# Patient Record
Sex: FEMALE | Race: WHITE
[De-identification: names, ages, dates, MRNs, and addresses within clinical notes are randomized per-mention and may not be internally consistent; named-entity substitution may affect disease eponyms.]

---

## 2018-01-02 ENCOUNTER — HOSPITAL ENCOUNTER (EMERGENCY)
Dept: HOSPITAL 25 - UCCORT | Age: 33
Discharge: HOME | End: 2018-01-02
Payer: COMMERCIAL

## 2018-01-02 VITALS — DIASTOLIC BLOOD PRESSURE: 73 MMHG | SYSTOLIC BLOOD PRESSURE: 133 MMHG

## 2018-01-02 DIAGNOSIS — J40: Primary | ICD-10-CM

## 2018-01-02 PROCEDURE — G0463 HOSPITAL OUTPT CLINIC VISIT: HCPCS

## 2018-01-02 PROCEDURE — 99212 OFFICE O/P EST SF 10 MIN: CPT

## 2018-01-02 NOTE — UC
Respiratory Complaint HPI





- HPI Summary


HPI Summary: 





6 days of bronchial cough, fatigue, no real fevers, coughing hurts center of 

chest





- History of Current Complaint


Chief Complaint: UCRespiratory


Stated Complaint: COUGH


Time Seen by Provider: 01/02/18 15:23


Hx Obtained From: Patient


Hx Last Menstrual Period: ON DEPO, DOES NOT HAVE REG PERIODS


Pregnant?: No


Onset/Duration: Gradual Onset, Lasting Days - 6, Still Present


Timing: Constant


Severity Initially: Moderate


Severity Currently: Moderate


Character: Cough: Nonproductive


Aggravating Factors: Nothing


Alleviating Factors: Nothing


Associated Signs And Symptoms: Positive: Chills, Pleuritic Chest Pain, URI





- Allergies/Home Medications


Allergies/Adverse Reactions: 


 Allergies











Allergy/AdvReac Type Severity Reaction Status Date / Time


 


No Known Allergies Allergy   Verified 01/02/18 15:28











Home Medications: 


 Home Medications





Etonogestrel [Nexplanon]  01/02/18 [History]











PMH/Surg Hx/FS Hx/Imm Hx


Previously Healthy: No


Psychological History: Depression





- Surgical History


Surgical History: Yes


Surgery Procedure, Year, and Place: Appy and bariatric surgery 6/2012





- Family History


Known Family History: Positive: Diabetes


   Negative: Cardiac Disease, Hypertension





- Social History


Occupation: Employed Full-time


Lives: With Family


Alcohol Use: Rare


Substance Use Type: None


Smoking Status (MU): Light Every Day Tobacco Smoker


Amount Used/How Often: 1/4 PPD


Cessation Counseling: Patient Advised to Stop





Review of Systems


Constitutional: Chills, Fatigue


Skin: Negative


Eyes: Negative


ENT: Sore Throat, Nasal Discharge


Respiratory: Cough


Cardiovascular: Negative


Gastrointestinal: Negative


Genitourinary: Negative


Motor: Negative


Neurovascular: Negative


Musculoskeletal: Negative


Neurological: Negative


Psychological: Negative


Is Patient Immunocompromised?: No


All Other Systems Reviewed And Are Negative: Yes





Physical Exam


Triage Information Reviewed: Yes


Appearance: Well-Appearing, No Pain Distress, Well-Nourished


Vital Signs Reviewed: Yes


Eye Exam: Normal


Eyes: Positive: Conjunctiva Clear


ENT Exam: Normal


ENT: Positive: Normal ENT inspection, Hearing grossly normal, Pharynx normal, 

Nasal congestion, TMs normal, Uvula midline.  Negative: Nasal drainage, Trismus

, Muffled voice, Hoarse voice, Dental tenderness, Sinus tenderness


Dental Exam: Normal


Neck exam: Normal


Neck: Positive: Supple, Nontender, No Lymphadenopathy


Respiratory Exam: Normal


Respiratory: Positive: Chest non-tender, Lungs clear, Normal breath sounds, No 

respiratory distress, No accessory muscle use


Cardiovascular Exam: Normal


Cardiovascular: Positive: RRR, No Murmur, Pulses Normal, Brisk Capillary Refill


Musculoskeletal Exam: Normal


Musculoskeletal: Positive: Strength Intact, ROM Intact, No Edema


Neurological Exam: Normal


Neurological: Positive: Alert, Muscle Tone Normal


Psychological Exam: Normal


Skin Exam: Normal





Respiratory Course/Dx





- Course


Course Of Treatment: increase fluids tessalon, zithromax follow with pcp prn





- Differential Dx/Diagnosis


Provider Diagnoses: Bronchitis





Discharge





- Discharge Plan


Condition: Stable


Disposition: HOME


Prescriptions: 


Azithromycin TAB* [Zithromax TAB (Z-EMETERIO) 250 mg #6 tabs] 2 tab PO .TODAY, THEN 

1 DAILY #1 emeterio


Benzonatate CAP* [Tessalon 100 MG CAP*] 100 mg PO TID PRN #30 cap


 PRN Reason: Cough


Patient Education Materials:  Acute Bronchitis (ED)


Referrals: 


Sailaja Lowe MD [Primary Care Provider] - If Needed

## 2018-02-05 ENCOUNTER — HOSPITAL ENCOUNTER (EMERGENCY)
Dept: HOSPITAL 25 - UCEAST | Age: 33
Discharge: HOME | End: 2018-02-05
Payer: COMMERCIAL

## 2018-02-05 VITALS — DIASTOLIC BLOOD PRESSURE: 107 MMHG | SYSTOLIC BLOOD PRESSURE: 153 MMHG

## 2018-02-05 DIAGNOSIS — R51: Primary | ICD-10-CM

## 2018-02-05 DIAGNOSIS — F17.210: ICD-10-CM

## 2018-02-05 DIAGNOSIS — M54.2: ICD-10-CM

## 2018-02-05 PROCEDURE — 99212 OFFICE O/P EST SF 10 MIN: CPT

## 2018-02-05 PROCEDURE — G0463 HOSPITAL OUTPT CLINIC VISIT: HCPCS

## 2018-02-05 NOTE — UC
Anne Marie CARDONA Julia, scribed for Jack Hanson MD on 02/05/18 at 1641 .





Headache HPI





- HPI Summary


HPI Summary: 





This patient is a 32 year old [place] [facility] [place] of 

constant occipital migraine since 2/2/18. She reports mild posterior neck pain. 

Patient denies this is her worst headache, vision changes or fever. The patient 

rates the pain 9/10 in severity. Symptoms aggravated by lights and neck 

extension. Patient states symptoms are similar to previous migraines. Has not 

found an oral medication that relieves these symptoms. Previous use of IV 

cocktails have worked.





- History Of Current Complaint


Chief Complaint: UCHeadache


Stated Complaint: HEADACHE


Time Seen by Provider: 02/05/18 16:29


Hx Obtained From: Patient


Hx Last Menstrual Period: unknown


Onset/Duration: Gradual Onset, Lasting Days


Onset Of Symptoms: Gradual, Still Present


Currently Pain Is: Current Pain Scale(0-10)= - 9


Pain Intensity: 9


Pain Scale Used: 0-10 Numeric


Timing: Constant


Location of Headache: Occipital


Aggravating Factor(s): Bright Lights, Other - neck extension


Allevating Factor(s): Nothing


Associated Signs And Symptoms: Positive: Negative





- Allergies/Home Medications


Allergies/Adverse Reactions: 


 Allergies











Allergy/AdvReac Type Severity Reaction Status Date / Time


 


No Known Allergies Allergy   Verified 02/05/18 15:54











Home Medications: 


 Home Medications





Acetaminophen [Acetaminophen Extra Strength] 1,000 mg PO ONCE 02/05/18 [History 

Confirmed 02/05/18]











PMH/Surg Hx/FS Hx/Imm Hx





- Additional Past Medical History


Additional PMH: 





Patient denies pregnancy. She states she has Nexplanon implant.


Previously Healthy: Yes





- Surgical History


Surgical History: Yes


Surgery Procedure, Year, and Place: Appy and bariatric surgery 6/2012





- Family History


Known Family History: Positive: Diabetes


   Negative: Cardiac Disease, Hypertension





- Social History


Alcohol Use: Occasionally


Substance Use Type: None


Smoking Status (MU): Light Every Day Tobacco Smoker


Amount Used/How Often: 1/4 PPD





- Immunization History


Most Recent Influenza Vaccination: none





Review of Systems


Constitutional: Negative


Eyes: Negative


Neurological: Headache


All Other Systems Reviewed And Are Negative: Yes





Physical Exam


Triage Information Reviewed: Yes


Vital Signs: 


 Initial Vital Signs











Temp  98.6 F   02/05/18 15:56


 


Pulse  72   02/05/18 15:56


 


Resp  14   02/05/18 15:56


 


BP  153/107   02/05/18 15:56


 


Pulse Ox  100   02/05/18 15:56











Vital Signs Reviewed: Yes





- Additional Comments





Appearance: Well-appearing, Well-nourished


Skin: Warm


Neck: Supple, nontender, 


Neurological: Normal, A&Ox3, negative Hardings and Brudzinskis test


General: No acute distress








Headache Course/Dx





- Course


Course Of Treatment: given 1 dose of meds and prescription for short course, 

instructed to use caution taking meds together with her home Prozac. Also 

instrcuted to fu with neurologist within 1 week for further mgmt of mirgraines. 

no neuro def. agrees to and understands dc instructions.





- Differential Dx/Diagnosis


Provider Diagnoses: headache





Discharge





- Discharge Plan


Condition: Stable


Disposition: HOME


Prescriptions: 


diPHENhydraMINE PO* [Benadryl PO 25 MG TAB*] 25 mg PO TID PRN #6 tab


 PRN Reason: Headache/Pain


Prochlorperazine TAB* [Compazine Tab*] 5 mg PO TID #6 tab


Patient Education Materials:  Migraine Headache (ED)


Referrals: 


Sailaja Lowe MD [Primary Care Provider] - 


Adi Lyons MD [Medical Doctor] - 


Additional Instructions: 


PLEASE MAKE AN APPOINTMENT WITH NEUROLOGIST TO BE SEEN WITHIN 1-2 WEEKS


PLEASE TAKE MEDICATIONS AS DIRECTED


PLEASE SEEK MEDICAL ATTENTION IMMEDIATELY IF YOU HAVE ANY WORSENING OR 

CONCERNING SYMPTOMS


PLEASE MAKE AN APPOINTMENT TO BE SEEN BY YOUR PRIMARY CARE DOCTOR WITHIN 1 WEEK





The documentation as recorded by the Anne Marie soria Julia accurately reflects 

the service I personally performed and the decisions made by me, Jack Hanson MD.

## 2018-02-20 ENCOUNTER — HOSPITAL ENCOUNTER (EMERGENCY)
Dept: HOSPITAL 25 - UCEAST | Age: 33
Discharge: HOME | End: 2018-02-20
Payer: COMMERCIAL

## 2018-02-20 VITALS — DIASTOLIC BLOOD PRESSURE: 88 MMHG | SYSTOLIC BLOOD PRESSURE: 129 MMHG

## 2018-02-20 DIAGNOSIS — R51: Primary | ICD-10-CM

## 2018-02-20 DIAGNOSIS — F32.9: ICD-10-CM

## 2018-02-20 DIAGNOSIS — F17.210: ICD-10-CM

## 2018-02-20 DIAGNOSIS — R11.0: ICD-10-CM

## 2018-02-20 PROCEDURE — 99212 OFFICE O/P EST SF 10 MIN: CPT

## 2018-02-20 PROCEDURE — 96372 THER/PROPH/DIAG INJ SC/IM: CPT

## 2018-02-20 PROCEDURE — G0463 HOSPITAL OUTPT CLINIC VISIT: HCPCS

## 2018-02-20 NOTE — UC
Headache HPI





- HPI Summary


HPI Summary: 





31 YO FEMALE WITH THE ONSET OF HER TYPICAL MIGRAINE YESTERDAY


NAUSEA AND PHOTOPHOBIA





HAS APPT IN ABOUT A WEEK WITH NEUROLOGIST





UNABLE TO TAKE ORAL NSAIDS DUE TOGASTRIC BYPASS





HAS BENADRYL AND ZOFRAN AT HOME





ABOUT A YR AND A HALF AGO HER MIGRAINES INCREASED IN FREQUENCY


SHE HAD A CT OF BRAIN AT THAT TIME (-)











- History Of Current Complaint


Chief Complaint: UCHeadache


Stated Complaint: HEADACHE


Time Seen by Provider: 02/20/18 10:32


Hx Obtained From: Patient


Hx Last Menstrual Period: IUD


Onset/Duration: Gradual Onset, Lasting Hours


Onset Of Symptoms: Gradual


Initially Headache Was: Severe


Currently Pain Is: Severe


Pain Intensity: 8


Pain Scale Used: 0-10 Numeric


Timing: Constant


Character: Throbbing, Typical Headache


Location of Headache: Occipital


Aggravating Factor(s): Nothing


Allevating Factor(s): Nothing


Associated Signs And Symptoms: Positive: Nausea, Neck Pain


Related History: Similar Episode/DX As: - MIGRAINE





- Allergies/Home Medications


Allergies/Adverse Reactions: 


 Allergies











Allergy/AdvReac Type Severity Reaction Status Date / Time


 


No Known Allergies Allergy   Verified 02/20/18 10:11














PMH/Surg Hx/FS Hx/Imm Hx


Previously Healthy: Yes


Neurological History: Migraine


Psychological History: Depression





- Surgical History


Surgical History: Yes


Surgery Procedure, Year, and Place: Appy and bariatric surgery 6/2012





- Family History


Known Family History: Positive: Diabetes, Other - FHx: MIGRAINES


   Negative: Cardiac Disease, Hypertension





- Social History


Alcohol Use: Occasionally


Substance Use Type: None


Smoking Status (MU): Light Every Day Tobacco Smoker


Amount Used/How Often: 5 cig/day





- Immunization History


Most Recent Influenza Vaccination: none





Review of Systems


Constitutional: Negative


Skin: Negative


Eyes: Negative


ENT: Negative


Respiratory: Negative


Cardiovascular: Negative


Gastrointestinal: Nausea


Genitourinary: Negative


Motor: Negative


Neurovascular: Negative


Musculoskeletal: Negative


Neurological: Headache


Psychological: Negative


Is Patient Immunocompromised?: No


All Other Systems Reviewed And Are Negative: Yes





Physical Exam


Triage Information Reviewed: Yes


Appearance: Well-Appearing, No Pain Distress, Well-Nourished


Vital Signs: 


 Initial Vital Signs











Temp  98.3 F   02/20/18 10:13


 


Pulse  81   02/20/18 10:13


 


Resp  16   02/20/18 10:13


 


BP  129/88   02/20/18 10:13


 


Pulse Ox  100   02/20/18 10:13











Vital Signs Reviewed: Yes


Eyes: Positive: Conjunctiva Clear, Other: - EOMI/PERRL, FUNDI BENIGN


ENT: Positive: Hearing grossly normal.  Negative: Nasal congestion, Nasal 

drainage, Trismus, Muffled voice, Hoarse voice, Dental tenderness


Neck: Positive: Supple, Nontender, No Lymphadenopathy


Respiratory: Positive: Lungs clear, Normal breath sounds, No respiratory 

distress, No accessory muscle use


Cardiovascular: Positive: RRR, No Murmur


Musculoskeletal: Positive: ROM Intact, No Edema


Neurological: Positive: Alert, Other: - CN2-12 INTACT, GCS 15/15, NORMAL GAIT, 

NONFOCAL EXAM


Psychological Exam: Normal


Skin Exam: Normal





Headache Course/Dx





- Differential Dx/Diagnosis


Provider Diagnoses: ACUTE HEADACHE.  SUSPECT MIGRAINE





Discharge





- Discharge Plan


Condition: Stable


Disposition: HOME


Patient Education Materials:  Migraine Headache (ED)


Forms:  *Work Release


Referrals: 


Sailaja Lowe MD [Primary Care Provider] - 


Additional Instructions: 


SEE NEUROLOGIST AS PLANNED





RECHECK FOR NEW OR WORSENING HEADACHE

## 2018-03-06 ENCOUNTER — HOSPITAL ENCOUNTER (EMERGENCY)
Dept: HOSPITAL 25 - UCCORT | Age: 33
Discharge: HOME | End: 2018-03-06
Payer: COMMERCIAL

## 2018-03-06 VITALS — SYSTOLIC BLOOD PRESSURE: 134 MMHG | DIASTOLIC BLOOD PRESSURE: 79 MMHG
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
## 2018-03-06 DIAGNOSIS — S92.812A: Primary | ICD-10-CM

## 2018-03-06 DIAGNOSIS — F17.210: ICD-10-CM
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
## 2018-03-06 PROCEDURE — 99213 OFFICE O/P EST LOW 20 MIN: CPT

## 2018-03-06 PROCEDURE — G0463 HOSPITAL OUTPT CLINIC VISIT: HCPCS

## 2018-03-06 NOTE — RAD
Indication: Left great toe injury.



3 views of left great toe demonstrates no fracture. No other bone or joint abnormality is

noted.



IMPRESSION: No fracture of the left great toe is noted.

## 2018-03-06 NOTE — UC
Lower Extremity/Ankle HPI





- HPI Summary


HPI Summary: 





31 yo female with atraumatic left great toe pain


onset after a walk


painful with with wt bearing/movement





no hx gout





unable to take NSAIDs orally due to gastric bypass











- History of Current Complaint


Chief Complaint: UCLowerExtremity


Stated Complaint: LFT BIG TOE INJURY


Time Seen by Provider: 03/06/18 14:30


Hx Obtained From: Patient


Hx Last Menstrual Period: nexplanon


Onset/Duration: Gradual Onset, Lasting Days


Severity Initially: Moderate


Severity Currently: Moderate


Pain Intensity: 7


Pain Scale Used: 0-10 Numeric


Aggravating Factor(s): Standing, Ambulation


Alleviating Factor(s): Rest, Elevation


Able to Bear Weight: Yes





- Allergies/Home Medications


Allergies/Adverse Reactions: 


 Allergies











Allergy/AdvReac Type Severity Reaction Status Date / Time


 


No Known Allergies Allergy   Verified 03/06/18 14:31














PMH/Surg Hx/FS Hx/Imm Hx


Previously Healthy: Yes


Neurological History: Migraine





- Surgical History


Surgical History: Yes


Surgery Procedure, Year, and Place: Appy and bariatric surgery 6/2012





- Family History


Known Family History: Positive: Diabetes, Other - FHx: MIGRAINES


   Negative: Cardiac Disease, Hypertension





- Social History


Alcohol Use: Occasionally


Substance Use Type: None


Smoking Status (MU): Light Every Day Tobacco Smoker


Amount Used/How Often: 5 cig/day





- Immunization History


Most Recent Influenza Vaccination: none





Review of Systems


Constitutional: Negative


Skin: Negative


Eyes: Negative


ENT: Negative


Respiratory: Negative


Cardiovascular: Negative


Gastrointestinal: Negative


Genitourinary: Negative


Motor: Negative


Neurovascular: Negative


Musculoskeletal: Arthralgia


Neurological: Negative


Psychological: Negative


Is Patient Immunocompromised?: No


All Other Systems Reviewed And Are Negative: Yes





Physical Exam


Triage Information Reviewed: Yes


Appearance: Well-Appearing, No Pain Distress, Well-Nourished


Vital Signs: 


 Initial Vital Signs











Temp  98.3 F   03/06/18 14:27


 


Pulse  75   03/06/18 14:27


 


Resp  16   03/06/18 14:27


 


BP  134/79   03/06/18 14:27


 


Pulse Ox  100   03/06/18 14:27











Vital Signs Reviewed: Yes


Eyes: Positive: Conjunctiva Clear


ENT: Positive: Hearing grossly normal.  Negative: Nasal congestion, Nasal 

drainage, Trismus, Muffled voice, Hoarse voice


Neck: Positive: Supple, Nontender


Respiratory: Positive: Lungs clear, Normal breath sounds, No respiratory 

distress, No accessory muscle use


Cardiovascular: Positive: RRR, No Murmur


Musculoskeletal: Positive: Other: - see image


Neurological: Positive: Alert


Psychological Exam: Normal


Skin Exam: Normal





Diagnostics





- Radiology


  ** No standard instances


Xray Interpretation: No Acute Changes


Radiology Interpretation Completed By: Radiologist





Lower Extremity Course/Dx





- Course


Course Of Treatment: despite negative XR read I am suspicious that pt has a 

sesamoid fx





- Differential Dx/Diagnosis


Provider Diagnoses: sesamoid fracture left great toe





Discharge





- Discharge Plan


Condition: Stable


Disposition: HOME


Patient Education Materials:  Swollen Joint (ED)


Referrals: 


Nasim Shearer MD [Medical Doctor] - As Soon As Possible


Additional Instructions: 


see orthopedist 


you may need an MRI to rule in or rule out a sesamoid boen fracture of the left 

great toe





post op shoe





crutches











Images


Feet (Multiple View): 


  __________________________














  __________________________





 1 - tender/swollen/limited ROM